# Patient Record
Sex: FEMALE | Race: WHITE | NOT HISPANIC OR LATINO | URBAN - METROPOLITAN AREA
[De-identification: names, ages, dates, MRNs, and addresses within clinical notes are randomized per-mention and may not be internally consistent; named-entity substitution may affect disease eponyms.]

---

## 2017-09-15 ENCOUNTER — EMERGENCY (EMERGENCY)
Facility: HOSPITAL | Age: 32
LOS: 0 days | Discharge: HOME | End: 2017-09-16

## 2017-09-15 DIAGNOSIS — R20.2 PARESTHESIA OF SKIN: ICD-10-CM

## 2017-09-15 DIAGNOSIS — F41.0 PANIC DISORDER [EPISODIC PAROXYSMAL ANXIETY]: ICD-10-CM

## 2017-09-15 DIAGNOSIS — R00.2 PALPITATIONS: ICD-10-CM

## 2018-01-11 ENCOUNTER — EMERGENCY (EMERGENCY)
Facility: HOSPITAL | Age: 33
LOS: 0 days | Discharge: HOME | End: 2018-01-11

## 2018-01-11 DIAGNOSIS — R56.9 UNSPECIFIED CONVULSIONS: ICD-10-CM

## 2018-01-11 DIAGNOSIS — S80.12XA CONTUSION OF LEFT LOWER LEG, INITIAL ENCOUNTER: ICD-10-CM
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
## 2018-01-11 DIAGNOSIS — M79.662 PAIN IN LEFT LOWER LEG: ICD-10-CM
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
## 2018-01-11 DIAGNOSIS — S16.1XXA STRAIN OF MUSCLE, FASCIA AND TENDON AT NECK LEVEL, INITIAL ENCOUNTER: ICD-10-CM

## 2018-08-02 ENCOUNTER — EMERGENCY (EMERGENCY)
Facility: HOSPITAL | Age: 33
LOS: 0 days | Discharge: HOME | End: 2018-08-02
Attending: EMERGENCY MEDICINE | Admitting: EMERGENCY MEDICINE

## 2018-08-02 VITALS
RESPIRATION RATE: 18 BRPM | SYSTOLIC BLOOD PRESSURE: 139 MMHG | HEART RATE: 104 BPM | OXYGEN SATURATION: 98 % | TEMPERATURE: 97 F | DIASTOLIC BLOOD PRESSURE: 75 MMHG
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
## 2018-08-02 DIAGNOSIS — Z04.1 ENCOUNTER FOR EXAMINATION AND OBSERVATION FOLLOWING TRANSPORT ACCIDENT: ICD-10-CM

## 2019-02-19 ENCOUNTER — OUTPATIENT (OUTPATIENT)
Dept: OUTPATIENT SERVICES | Facility: HOSPITAL | Age: 34
LOS: 1 days | Discharge: HOME | End: 2019-02-19

## 2019-02-20 DIAGNOSIS — R53.83 OTHER FATIGUE: ICD-10-CM

## 2019-02-20 DIAGNOSIS — Z13.29 ENCOUNTER FOR SCREENING FOR OTHER SUSPECTED ENDOCRINE DISORDER: ICD-10-CM

## 2019-07-17 ENCOUNTER — TRANSCRIPTION ENCOUNTER (OUTPATIENT)
Age: 34
End: 2019-07-17

## 2019-07-18 ENCOUNTER — EMERGENCY (EMERGENCY)
Facility: HOSPITAL | Age: 34
LOS: 0 days | Discharge: HOME | End: 2019-07-18
Attending: EMERGENCY MEDICINE | Admitting: EMERGENCY MEDICINE
Payer: COMMERCIAL

## 2019-07-18 VITALS
HEART RATE: 80 BPM | OXYGEN SATURATION: 100 % | TEMPERATURE: 97 F | RESPIRATION RATE: 20 BRPM | DIASTOLIC BLOOD PRESSURE: 68 MMHG | SYSTOLIC BLOOD PRESSURE: 125 MMHG

## 2019-07-18 VITALS — DIASTOLIC BLOOD PRESSURE: 80 MMHG | HEART RATE: 73 BPM | SYSTOLIC BLOOD PRESSURE: 123 MMHG | RESPIRATION RATE: 20 BRPM

## 2019-07-18 DIAGNOSIS — S02.2XXA FRACTURE OF NASAL BONES, INITIAL ENCOUNTER FOR CLOSED FRACTURE: ICD-10-CM

## 2019-07-18 DIAGNOSIS — S00.83XA CONTUSION OF OTHER PART OF HEAD, INITIAL ENCOUNTER: ICD-10-CM
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
## 2019-07-18 PROCEDURE — 99284 EMERGENCY DEPT VISIT MOD MDM: CPT | Mod: 25

## 2019-07-18 PROCEDURE — 70160 X-RAY EXAM OF NASAL BONES: CPT | Mod: 26

## 2019-07-18 PROCEDURE — 21310: CPT

## 2019-07-18 PROCEDURE — 70486 CT MAXILLOFACIAL W/O DYE: CPT | Mod: 26

## 2019-07-18 RX ORDER — IBUPROFEN 200 MG
600 TABLET ORAL ONCE
Refills: 0 | Status: COMPLETED | OUTPATIENT
Start: 2019-07-18 | End: 2019-07-18

## 2019-07-18 RX ADMIN — Medication 600 MILLIGRAM(S): at 19:31

## 2019-07-18 NOTE — ED PROVIDER NOTE - OBJECTIVE STATEMENT
34 year old female with no pmhx presents s/p fall 2 days ago. pt admits tripped and fell while going down the steps and hit front of face. pt complaining of pain to nose and right cheek. pt denies loc, HA, dizziness, visual changes, paresthesias or weakness, neck or back pain, N/V, or epistaxis.

## 2019-07-18 NOTE — ED ADULT NURSE NOTE - OBJECTIVE STATEMENT
Pt presents to ed with increased facial pain 2/2 nasal fx 2/2 fall. Pt states she was told by urgent care to follow up with a plastic surgeon, pt states she has not.

## 2019-07-18 NOTE — ED ADULT TRIAGE NOTE - CHIEF COMPLAINT QUOTE
fell down stairs 2 days ago, was seen in urgent and was told she has a nasal fracture, has a lot of facial pain today

## 2019-07-18 NOTE — ED PROVIDER NOTE - ATTENDING CONTRIBUTION TO CARE
34 year old female, no pmhx, presenting s/p fall 2 days ago. States she tripped and fell while going down steps and hit the front of her face. Denies LOC but states she has been having pain to her right cheek and bilateral nose since the fall described as sharp, non-radiating, no palliative or provocative factors, intermittent, mild severity. Sent by  today for CT to r/o facial bone fx. Otherwise denies other symptoms or complaints.    Vital Signs: I have reviewed the initial vital signs.  Constitutional: NAD, well-nourished, appears stated age, no acute distress.  HEENT: Airway patent, moist MM, no erythema/swelling/deformity of oral structures. EOMI, PERRLA. No septal hematoma  CV: regular rate, regular rhythm, well-perfused extremities, 2+ b/l DP and radial pulses equal.  Lungs: BCTA, no increased WOB.  ABD: NTND, no guarding or rebound, no pulsatile mass, no hernias.   MSK: Neck supple, nontender, nl ROM, no stepoff. Chest nontender. Back nontender in TLS spine or to b/l bony structures or flanks. Ext nontender, nl rom, no deformity.   INTEG: Skin warm, dry, no rash.  NEURO: A&Ox3, normal strength, nl sensation throughout, normal speech.   PSYCH: Calm, cooperative, normal affect and interaction.    No evidence of significant trauma on exam. Will obtain CT facial bones to r/o facial bone injury. Patient otherwise well appearing, fully neurovascular intact.

## 2019-07-18 NOTE — ED PROVIDER NOTE - CARE PROVIDER_API CALL
Duong Hussein)  Otolaryngology  [address] [place] [address], [address] [place] [address]  Phone: [phone]  Fax: [phone]  Follow Up Time:

## 2019-07-18 NOTE — ED PROVIDER NOTE - PHYSICAL EXAMINATION
CONST: Well appearing in NAD  EYES: PERRL, EOMI, Sclera and conjunctiva clear. no pain with EOM  ENT: + tenderness to nasal bridge, and right zygomatic arch, No nasal discharge. TM's clear . Oropharynx normal appearingUvula midline. No temporal artery or mastoid tenderness.  NECK: Non-tender, normal ROM.   CARD: Normal S1 S2; Normal rate and rhythm  RESP: Equal BS B/L, No wheezes, rhonchi or rales. No distress  GI: Soft, non-tender, non-distended. no cva tenderness. normal BS  MS: Normal ROM in all extremities. No midline spinal tenderness. pulses 2 +.   SKIN: + small ecchymosis to right zygomatic region  NEURO: A&Ox3, No focal deficits. Strength 5/5 with no sensory deficits. Steady gait. Finger to nose intact. Negative pronator drift

## 2019-07-18 NOTE — ED PROVIDER NOTE - CLINICAL SUMMARY MEDICAL DECISION MAKING FREE TEXT BOX
Patient presented s/p mechanical fall, facial trauma. Otherwise afebrile, HD stable, well appearing. Obtained CT facial bones which showed nondisplaced bilateral nasal bone fx, but otherwise no evidence of other injuries. Patient informed of this and agrees to follow up with ENT. Otherwise fully neurovascular intact, ambulatory, tolerates PO. Agrees to return to ED for any new or worsening symptoms.

## 2019-07-18 NOTE — ED ADULT NURSE NOTE - NSIMPLEMENTINTERV_GEN_ALL_ED
Implemented All Universal Safety Interventions:  [place] to call system. Call bell, personal items and telephone within reach. Instruct patient to call for assistance. Room bathroom lighting operational. Non-slip footwear when patient is off stretcher. Physically safe environment: no spills, clutter or unnecessary equipment. Stretcher in lowest position, wheels locked, appropriate side rails in place.

## 2019-07-19 PROBLEM — Z00.00 ENCOUNTER FOR PREVENTIVE HEALTH EXAMINATION: Status: ACTIVE | Noted: 2019-07-19

## 2019-07-26 ENCOUNTER — TRANSCRIPTION ENCOUNTER (OUTPATIENT)
Age: 34
End: 2019-07-26

## 2019-08-01 ENCOUNTER — APPOINTMENT (OUTPATIENT)
Age: 34
End: 2019-08-01
Payer: COMMERCIAL

## 2019-08-01 VITALS
DIASTOLIC BLOOD PRESSURE: 70 MMHG | BODY MASS INDEX: 21.66 KG/M2 | HEIGHT: 65 IN | WEIGHT: 130 LBS | SYSTOLIC BLOOD PRESSURE: 118 MMHG

## 2019-08-01 DIAGNOSIS — S09.92XA UNSPECIFIED INJURY OF NOSE, INITIAL ENCOUNTER: ICD-10-CM

## 2019-08-01 DIAGNOSIS — J34.89 OTHER SPECIFIED DISORDERS OF NOSE AND NASAL SINUSES: ICD-10-CM

## 2019-08-01 DIAGNOSIS — J34.3 HYPERTROPHY OF NASAL TURBINATES: ICD-10-CM

## 2019-08-01 DIAGNOSIS — J34.2 DEVIATED NASAL SEPTUM: ICD-10-CM

## 2019-08-01 DIAGNOSIS — R04.0 EPISTAXIS: ICD-10-CM

## 2019-08-01 DIAGNOSIS — Z78.9 OTHER SPECIFIED HEALTH STATUS: ICD-10-CM

## 2019-08-01 PROCEDURE — 99203 OFFICE O/P NEW LOW 30 MIN: CPT | Mod: 25

## 2019-08-01 PROCEDURE — 31231 NASAL ENDOSCOPY DX: CPT

## 2019-08-01 RX ORDER — FLUTICASONE PROPIONATE 50 UG/1
50 SPRAY, METERED NASAL DAILY
Qty: 1 | Refills: 6 | Status: ACTIVE | COMMUNITY
Start: 2019-08-01 | End: 1900-01-01

## 2019-08-01 NOTE — PHYSICAL EXAM
[] : septum deviated to the right [Nasal Endoscopy Performed] : nasal endoscopy was performed, see procedure section for findings [de-identified] : nasal valve repair [de-identified] : edema  [Midline] : trachea located in midline position [Normal] : no rashes

## 2019-08-01 NOTE — HISTORY OF PRESENT ILLNESS
[de-identified] : 34 Year old female comes in the office as a new patient who fell down the stairs 2 weeks ago, resulting in nasal fracture.  Patient is c/o nasal obstruction which is waking her  up at night. She has had a little bleeding from her nose. She had some dizziness and was told by  that she has a concussion. She also sees a change in the appearance on the outside. Her nose is .  She had a cT

## 2019-08-01 NOTE — PROCEDURE
[Recalcitrant Symptoms] : recalcitrant symptoms  [Epistaxis] : evaluation of epistaxis [Congested] : congested [Rigid Endoscope] : examined with a rigid endoscope [Normal] : the paranasal sinuses had no abnormalities [FreeTextEntry6] : The following anatomic sites were directly examined in a sequential fashion:\par The scope was introduced in the nasal passage between the middle and inferior turbinates to exam the inferior portion of the middle meatus and the fontanelle, as well as the maxillary ostia. Next, the scope was passed medically and posteriorly to the middle turbinates to examine the sphenoethmoid recess and the superior turbinate region.\par  [de-identified] : s/p trauma

## 2019-08-12 ENCOUNTER — EMERGENCY (EMERGENCY)
Facility: HOSPITAL | Age: 34
LOS: 0 days | Discharge: HOME | End: 2019-08-12
Attending: EMERGENCY MEDICINE | Admitting: EMERGENCY MEDICINE
Payer: COMMERCIAL

## 2019-08-12 VITALS
TEMPERATURE: 99 F | SYSTOLIC BLOOD PRESSURE: 121 MMHG | WEIGHT: 130.07 LBS | OXYGEN SATURATION: 98 % | HEART RATE: 70 BPM | DIASTOLIC BLOOD PRESSURE: 78 MMHG | HEIGHT: 65 IN

## 2019-08-12 VITALS
OXYGEN SATURATION: 100 % | RESPIRATION RATE: 18 BRPM | DIASTOLIC BLOOD PRESSURE: 74 MMHG | HEART RATE: 53 BPM | SYSTOLIC BLOOD PRESSURE: 130 MMHG

## 2019-08-12 DIAGNOSIS — H53.8 OTHER VISUAL DISTURBANCES: ICD-10-CM

## 2019-08-12 DIAGNOSIS — T42.8X5A ADVERSE EFFECT OF ANTIPARKINSONISM DRUGS AND OTHER CENTRAL MUSCLE-TONE DEPRESSANTS, INITIAL ENCOUNTER: ICD-10-CM

## 2019-08-12 DIAGNOSIS — R11.2 NAUSEA WITH VOMITING, UNSPECIFIED: ICD-10-CM

## 2019-08-12 DIAGNOSIS — R25.1 TREMOR, UNSPECIFIED: ICD-10-CM

## 2019-08-12 LAB
APPEARANCE UR: ABNORMAL
BACTERIA # UR AUTO: ABNORMAL
BILIRUB UR-MCNC: ABNORMAL
COLOR SPEC: YELLOW — SIGNIFICANT CHANGE UP
DIFF PNL FLD: ABNORMAL
EPI CELLS # UR: ABNORMAL /HPF
GLUCOSE UR QL: NEGATIVE MG/DL — SIGNIFICANT CHANGE UP
KETONES UR-MCNC: 40
LEUKOCYTE ESTERASE UR-ACNC: ABNORMAL
NITRITE UR-MCNC: NEGATIVE — SIGNIFICANT CHANGE UP
PH UR: 6.5 — SIGNIFICANT CHANGE UP (ref 5–8)
PROT UR-MCNC: 30 MG/DL
RBC CASTS # UR COMP ASSIST: ABNORMAL /HPF
SP GR SPEC: 1.02 — SIGNIFICANT CHANGE UP (ref 1.01–1.03)
UROBILINOGEN FLD QL: 1 MG/DL (ref 0.2–0.2)
WBC UR QL: ABNORMAL /HPF

## 2019-08-12 PROCEDURE — 99284 EMERGENCY DEPT VISIT MOD MDM: CPT

## 2019-08-12 PROCEDURE — 70450 CT HEAD/BRAIN W/O DYE: CPT | Mod: 26

## 2019-08-12 RX ORDER — NITROFURANTOIN MACROCRYSTAL 50 MG
1 CAPSULE ORAL
Qty: 10 | Refills: 0
Start: 2019-08-12 | End: 2019-08-16

## 2019-08-12 RX ORDER — LOPERAMIDE HCL 2 MG
4 TABLET ORAL ONCE
Refills: 0 | Status: COMPLETED | OUTPATIENT
Start: 2019-08-12 | End: 2019-08-12

## 2019-08-12 RX ORDER — ALPRAZOLAM 0.25 MG
0.5 TABLET ORAL ONCE
Refills: 0 | Status: DISCONTINUED | OUTPATIENT
Start: 2019-08-12 | End: 2019-08-12

## 2019-08-12 RX ORDER — FLUCONAZOLE 150 MG/1
1 TABLET ORAL
Qty: 14 | Refills: 0
Start: 2019-08-12 | End: 2019-08-25

## 2019-08-12 RX ORDER — FLUCONAZOLE 150 MG/1
1 TABLET ORAL
Qty: 1 | Refills: 0
Start: 2019-08-12 | End: 2019-08-12

## 2019-08-12 RX ADMIN — Medication 0.5 MILLIGRAM(S): at 13:55

## 2019-08-12 RX ADMIN — Medication 4 MILLIGRAM(S): at 13:55

## 2019-08-12 RX ADMIN — Medication 0.5 MILLIGRAM(S): at 11:08

## 2019-08-12 NOTE — ED PROVIDER NOTE - PHYSICAL EXAMINATION
Vital Signs: I have reviewed the initial vital signs.  Constitutional: Anxious appearing, well-nourished, appears stated age  Cardiovascular: regular rate, regular rhythm, well-perfused extremities  Respiratory: unlabored respiratory effort, clear to auscultation bilaterally  Gastrointestinal: soft, non-tender abdomen, no pulsatile mass  Musculoskeletal: supple neck, no lower extremity edema  Integumentary: warm, dry, no rash  Neurologic: awake, alert, cranial nerves II-XII grossly intact, extremities’ motor and sensory functions grossly intact  Psychiatric: appropriate mood, appropriate affect

## 2019-08-12 NOTE — ED ADULT NURSE NOTE - CHIEF COMPLAINT QUOTE
Pt states took xanaflex 2 tabs 3 x a day when she was only suppose to take 1/2 tab 3x daily. Last took meds 3 days ago. Now c/o nausea and dark urine.  States she is seeing things.

## 2019-08-12 NOTE — ED PROVIDER NOTE - ATTENDING CONTRIBUTION TO CARE
anxiety and visual disturbance since minor head trauma and also since stopping Xanax.  also corresponds to use of muscle relaxor use.  VS noted.  mild tremor.  eyes, ENT wnl.  neck NT.  Chest clear.  Heart RR no murmur.  abd NT.  Ext FROM.  =pulses. neuro intact.  dx testing reviewed. decreased sx with Xanax.  may be due to w/d or concussion sx.

## 2019-08-12 NOTE — ED PROVIDER NOTE - NSFOLLOWUPINSTRUCTIONS_ED_ALL_ED_FT
Continue home medication alprazolam. Do not resume zanaflex until follow up with primary care doctor.     Please follow up with provided primary care/specialists as needed.    Return to the ED if symptoms persist or worsen. Continue home medication alprazolam. Do not resume Zanaflex until follow up with primary care doctor.     Please follow up with provided primary care/specialists as needed.    Return to the ED if symptoms persist or worsen.    Urinary Tract Infection    A urinary tract infection (UTI) is an infection of any part of the urinary tract, which includes the kidneys, ureters, bladder, and urethra. Risk factors include ignoring your need to urinate, wiping back to front if female, being an uncircumcised male, and having diabetes or a weak immune system. Symptoms include frequent urination, pain or burning with urination, foul smelling urine, cloudy urine, pain in the lower abdomen, blood in the urine, and fever. If you were prescribed an antibiotic medicine, take it as told by your health care provider. Do not stop taking the antibiotic even if you start to feel better.    SEEK IMMEDIATE MEDICAL CARE IF YOU HAVE THE FOLLOWING SYMPTOMS: severe back or abdominal pain, inability to keep fluids or medicine down, dizziness/lightheadedness, or a change in mental status.

## 2019-08-12 NOTE — ED ADULT TRIAGE NOTE - CHIEF COMPLAINT QUOTE
Pt states took xanaflex 2 tabs 3 x a day when she was only suppose to take 1/2 tab 3x daily. Last took meds 3 days ago. Now c/o nausea and dark urine.  States she is seeing things. Pt states took Zanaflex 2 tabs 3 x a day when she was only suppose to take 1/2 tab 3x daily. Last took meds 3 days ago. Now c/o nausea and dark urine.  States she is seeing things.

## 2019-08-12 NOTE — ED PROVIDER NOTE - CARE PROVIDER_API CALL
Benjamin Cortes (DO)  Psychiatry Residents  [address] [address]  Phone: [phone]  Fax: [phone]  Follow Up Time:     Jaret Mendes (MD)  Internal Medicine  [address] [place] [address]  Phone: [phone]  Fax: [phone]  Follow Up Time:

## 2019-08-12 NOTE — ED PROVIDER NOTE - OBJECTIVE STATEMENT
The pt is a 34y F with a hx of anxiety presenting after taking 2 tablets 3x a day of zanaflex for muscle spasms instead of the prescribed 1/2 tab 3x a day. She last took the medication 3 days ago. Also stopped taking alprazolam at the same time. Since stopping the medication, she reports changes in vision described as a "fog or film." Also reports N/V, non-bloody non-bilious multiple episodes per day for 2 days which stopped day before yesterday. Reports some constipation, which can be normal for her. Endorses dark urine with fowl odor. Notes numbness in the extremities.

## 2019-08-12 NOTE — ED PROVIDER NOTE - PROGRESS NOTE DETAILS
Pt is feeling better after 0.5mg alprazolam. "Fog" in vision has resolved. Pt received additional 0.5mg alprazolam. Pending CT head.

## 2019-08-12 NOTE — ED PROVIDER NOTE - NS ED ROS FT
Constitutional: (-) fever  Eyes/ENT: (+) blurry vision described as a fog, (-) epistaxis  Cardiovascular: (-) chest pain, (-) syncope  Respiratory: (-) cough, (+) shortness of breath similar to previous episodes secondary to anxiety   Gastrointestinal: (+) vomiting for 2 days which ended day before yesterday, (-) diarrhea  Musculoskeletal: (-) neck pain, (+) back pain related to muscle spasms, (-) joint pain  Integumentary: (-) rash, (-) edema  Neurological: (-) headache, (-) altered mental status (+) numbness in extremities   Psychiatric: (-) hallucinations  Allergic/Immunologic: (-) pruritus

## 2019-08-27 NOTE — ED ADULT NURSE NOTE - NS ED PATIENT SAFETY CONCERN
"8/27/2019       RE: Garry Ferreira  [address] [facility] [address]     Dear Colleague,    Thank you for referring your patient, Garry Ferreira, to the [facility] DERMATOLOGY at [facility]. Please see a copy of my visit note below.                            Pictures were placed in Pt's chart today for future reference.      [facility] Dermatology Note    Dermatology Problem List:  1. Alopecia areata  - S/p ILK 8/27/19  - Current Tx: xeljanz 5mg BID, clobetasol cream (new spots), and ketoconazole shampoo  - monitoring labs wnl: 7/2019    Encounter Date: Aug 27, 2019    CC:   Alopecia areata    History of Present Illness:  Ms. Garry Ferreira is a 16 year old female with past dermatologic history listed above who presents as a follow-up for alopecia areata. The patient was last seen 10/9/18. Started Xeljanz in 1/2019 when insurance \"reset\". Believes that it has helped but unsure. Has noted some pruritus and flakiness. Has two new spots as well as a recalcitrant spot on ophiasis region. Denies any erythema, infections, or hospitalizations.    Of note, going to be a senior in high school this year. Unsure about where to go to college. Believes that school stress worsens her alopecia    Past Medical History:   Patient Active Problem List   Diagnosis     Alopecia areata     Medication monitoring encounter     No past medical history on file.  No past surgical history on file.    Social History:  Patient  reports that she has never smoked. She has never used smokeless tobacco.    Family History:  No family history on file.    Medications:  Current Outpatient Medications   Medication Sig Dispense Refill     clobetasol (TEMOVATE) 0.05 % external cream Apply topically 2 times daily To new patches in addition to the surrounding half-inch of hair 60 g 1     clobetasol propionate (CLOBEX) 0.05 % SHAM Apply to a dry scalp, leave on for 10 minutes, then lather " and rinse, do 5 times per week 1 Bottle 3     [START ON 8/28/2019] ketoconazole (NIZORAL) 2 % external shampoo Apply topically three times a week 120 mL 3     tofacitinib (XELJANZ) 5 MG tablet Take 1 tablet (5 mg) by mouth 2 times daily 180 tablet 3        No Known Allergies    Review of Systems:  - As per HPI  - Constitutional: Otherwise feeling well today, in usual state of health.  - Skin: As above in HPI. No additional skin concerns.    Physical exam:  Vitals: /66 (BP Location: Right arm, Patient Position: Chair, Cuff Size: Adult Regular)   Pulse 61   GEN: This is a well developed, well-nourished female in no acute distress, in a pleasant mood.    SKIN: Focused examination of the face, scalp, neck, and hands was performed.  - Paula Skin Type II  - Four well-defined patches of alopecia without any overlying scale or erythema. Has black pigment noted  - Hair pull test is negative.  - The eyebrows are WNL, eyelashes are WNL, and nails are within normal limits. No pitting or onycholysis.   - No other lesions of concern on areas examined.     Impression/Plan:  1. Alopecia areata    Condition has significantly improved on current regimen. Still has some new areas which are amenable to ILK today in clinic. No topicals at home for new spots    Kenalog intralesional injection procedure note: After verbal consent and discussion of risks including but not limited to atrophy, pain, and bruising, time out was performed, the patient underwent positioning and the area was prepped with isopropyl alcohol, 1.7 total cc of Kenalog 10 mg/cc was injected into 20 sites on the scalp. The patient tolerated the procedure well and left the Dermatology clinic in good condition.    Continue with Xeljanz 5mg BID    Can consider downtaper pending next office visit    Prescribed topical clobetasol cream to be used on new spots and the surrounding half inch    Photodocumentation obtained in clinic    Will need monitoring labs at  next office visit (CBC, CMP, UA, and lipid)    RTC in 3 months    2. Seborrheic dermatitis    Discussed the possible etiologies, clinical course, and management options of this benign condition with the patient.    Prescribed 2% ketoconazole shampoo three times per week    Follow-up in 3 months, earlier for new or changing lesions.     Staff Involved:  Patient was seen and staffed with attending physician Dr. Olegario Minaya MD  Med/Derm Resident PGY-2  P:[phone]    Patient was seen and examined with the medicine/dermatology resident. I agree with the history, review of systems, physical examination, assessments and plan. ILK injections were done together.      Sandhya Melvin MD  Professor and  Chair  Department of Dermatology  [facility]    Again, thank you for allowing me to participate in the care of your patient.      Sincerely,    Sandhya Melvin MD       No

## 2019-09-05 ENCOUNTER — APPOINTMENT (OUTPATIENT)
Dept: OTOLARYNGOLOGY | Facility: CLINIC | Age: 34
End: 2019-09-05

## 2021-09-25 ENCOUNTER — EMERGENCY (EMERGENCY)
Facility: HOSPITAL | Age: 36
LOS: 0 days | Discharge: HOME | End: 2021-09-25
Attending: EMERGENCY MEDICINE | Admitting: EMERGENCY MEDICINE
Payer: COMMERCIAL

## 2021-09-25 VITALS
WEIGHT: 164.91 LBS | DIASTOLIC BLOOD PRESSURE: 69 MMHG | OXYGEN SATURATION: 99 % | SYSTOLIC BLOOD PRESSURE: 125 MMHG | TEMPERATURE: 97 F | RESPIRATION RATE: 18 BRPM | HEART RATE: 83 BPM | HEIGHT: 65 IN

## 2021-09-25 VITALS
HEART RATE: 58 BPM | SYSTOLIC BLOOD PRESSURE: 139 MMHG | DIASTOLIC BLOOD PRESSURE: 89 MMHG | OXYGEN SATURATION: 100 % | RESPIRATION RATE: 16 BRPM

## 2021-09-25 DIAGNOSIS — F11.23 OPIOID DEPENDENCE WITH WITHDRAWAL: ICD-10-CM

## 2021-09-25 DIAGNOSIS — Z87.39 PERSONAL HISTORY OF OTHER DISEASES OF THE MUSCULOSKELETAL SYSTEM AND CONNECTIVE TISSUE: ICD-10-CM

## 2021-09-25 DIAGNOSIS — Z79.899 OTHER LONG TERM (CURRENT) DRUG THERAPY: ICD-10-CM

## 2021-09-25 DIAGNOSIS — F19.239 OTHER PSYCHOACTIVE SUBSTANCE DEPENDENCE WITH WITHDRAWAL, UNSPECIFIED: ICD-10-CM

## 2021-09-25 PROBLEM — M62.830 MUSCLE SPASM OF BACK: Chronic | Status: ACTIVE | Noted: 2019-08-12

## 2021-09-25 PROCEDURE — 99284 EMERGENCY DEPT VISIT MOD MDM: CPT

## 2021-09-25 RX ORDER — TIZANIDINE 4 MG/1
0 TABLET ORAL
Qty: 0 | Refills: 0 | DISCHARGE

## 2021-09-25 NOTE — ED PROVIDER NOTE - OBJECTIVE STATEMENT
35 yo F c/o withdrawal. Patient took Suboxone today  which precipitated withdrawal.  Last Oxycodone use was 2 days ago. She started feeling sick and couldn't take care of her baby so she called 911. No homicidal or suicidal ideations. Admits to drinking alcohol last night.

## 2021-09-25 NOTE — ED ADULT NURSE NOTE - CHIEF COMPLAINT QUOTE
Pt BIBA from home.  Pt states "I took suboxone and it put me in withdrawals".  Per EMS patient stated "I needed you to take care of the baby.  I drank last night and took suboxone this morning".

## 2021-09-25 NOTE — ED PROVIDER NOTE - NSFOLLOWUPCLINICS_GEN_ALL_ED_FT
[facility]  Detox Mgmt  [address] [place] [address]  Phone: [phone]  Fax:   Follow Up Time: 1-3 Days

## 2021-09-25 NOTE — ED ADULT TRIAGE NOTE - CHIEF COMPLAINT QUOTE
Pt BIBA from home.  Pt states "I took suboxone and it put me in withdrawals". Pt BIBA from home.  Pt states "I took suboxone and it put me in withdrawals".  Per EMS patient stated "I needed you to take care of the baby.  I drank last night and took suboxone this morning".

## 2021-09-25 NOTE — ED PROVIDER NOTE - PATIENT PORTAL LINK FT
You can access the FollowMyHealth Patient Portal offered by [facility] by registering at the following website: [email or web address]. By joining [employer]’s FollowMyHealth portal, you will also be able to view your health information using other applications (apps) compatible with our system.

## 2021-09-25 NOTE — ED PROVIDER NOTE - CLINICAL SUMMARY MEDICAL DECISION MAKING FREE TEXT BOX
36y female with precipitated withdrawal, called 911 as she feared she would be unable to properly care for her 3wk old infant after being unable to contact family, PE as above, is po tolerant, will d/c

## 2021-09-25 NOTE — ED PROVIDER NOTE - NSFOLLOWUPINSTRUCTIONS_ED_ALL_ED_FT
Opioid Withdrawal  Opioids are powerful substances that relieve pain. Opioids include illegal drugs, such as heroin, as well as prescription pain medicines, such as codeine, morphine, hydrocodone, oxycodone, and fentanyl. Opioid withdrawal is a group of symptoms that can happen if you have been taking opioids for a long time and suddenly stop.    What are the causes?  This condition is caused by taking opioids for weeks and then doing any of the following:  Stopping use.  Rapidly reducing use.  Taking a medicine to block their effect.  What increases the risk?  This condition is more likely to develop in:  People who take opioids incorrectly.  People who take opioids for a long period of time.  What are the signs or symptoms?  Symptoms of this condition can be physical or mental. Physical symptoms include:  Nausea and vomiting.  Muscle aches or spasms.  Watery eyes and runny nose.  Widening of the dark centers of the eyes (dilated pupils).  Hair standing on end.  Fever and sweating.  Intestinal cramping and diarrhea.  Increased blood pressure and fast pulse.  Mental symptoms include:  Depression.  Anxiety.  Restlessness and irritability.  Trouble sleeping.  When symptoms start and how long they last depends on if you have been taking an opioid that works fast and then loses its effect quickly (short acting-opioid), an opioid that works for a longer period of time (long-acting opioid), or a drug that blocks the effects of opioids.  If you have been taking a short-acting opioid, such as heroin and oxycodone, symptoms occur within hours of stopping or reducing the amount you take. The worst symptoms (peak withdrawal) occur in 24–48 hours. Symptoms should subside in 3–5 days.  If you have been taking a long-acting opioid, such as methadone, symptoms can occur within 30 hours of stopping or reducing the amount you take and can continue for up to 10 days.  If you are taking a drug that blocks the effects of opioids, such as naltrexone or naloxone, symptoms begin within minutes.  How is this diagnosed?  This condition is diagnosed based on:  Your symptoms.  Your medical history.  Your history of drug and alcohol use.  Which medicines you have been taking.  Your health care provider may:  Perform a physical exam.  Order tests.  Ask that you see a mental health professional.  How is this treated?  ImageTreatment for this condition is usually provided by mental health professionals with training in substance use disorders (addiction specialists). Treatment may involve:  Counseling. This treatment is also called talk therapy. It is provided by substance use treatment counselors.  Support groups. Support groups are run by people who have quit using opioids. They provide emotional support, advice, and guidance.  Medicine. Some medicines can help to lessen certain withdrawal symptoms. Sometimes an opioid is prescribed to replace the opioid that you have been taking. You may be asked to take less and less of this opioid over time to lessen or prevent withdrawal symptoms.  Follow these instructions at home:  Take over-the-counter and prescription medicines only as told by your health care provider.  Check with your health care provider before starting any new medicines.  Keep all follow-up visits as told by your health care provider. This is important.  Contact a health care provider if:  You are not able to take your medicines as told.  Your symptoms get worse.  You take an opioid after stopping use, or you take more of an opioid than you have been.  Get help right away if:  You have a seizure.  You lose consciousness.  You have serious thoughts about hurting yourself or others.  If you ever feel like you may hurt yourself or others, or have thoughts about taking your own life, get help right away. You can go to your nearest emergency department or call:  Your local emergency services (911 in the [place]  A suicide crisis helpline, such as the National Suicide Prevention Lifeline at 1-694.550.8391. This is open 24 hours a day.  This information is not intended to replace advice given to you by your health care provider. Make sure you discuss any questions you have with your health care provider.

## 2021-09-25 NOTE — ED PROVIDER NOTE - NS ED ROS FT
Review of Systems    Constitutional: (-) fever, (-) chills  Eyes/ENT: (-) blurry vision, (-) epistaxis, (-) sore throat  Cardiovascular: (-) chest pain, (-) syncope  Respiratory: (-) cough, (-) shortness of breath  Gastrointestinal: (-) pain, (+) nausea, (-) vomiting, (+) diarrhea  Musculoskeletal: (-) neck pain, (-) back pain, (-) body aches  Integumentary: (-) rash, (-) edema  Neurological: (-) headache, (-) altered mental status  Psychiatric: (-) hallucinations  Allergic/Immunologic: (-) pruritus

## 2022-10-19 NOTE — ED ADULT NURSE NOTE - IN THE PAST 12 MONTHS HAVE YOU USED DRUGS OTHER THAN THOSE REQUIRED FOR MEDICAL REASON?
Yes
2018
